# Patient Record
(demographics unavailable — no encounter records)

---

## 2024-10-28 NOTE — CONSULT LETTER
[Dear  ___] : Dear  [unfilled], [Consult Letter:] : I had the pleasure of evaluating your patient, [unfilled]. [Please see my note below.] : Please see my note below. [Consult Closing:] : Thank you very much for allowing me to participate in the care of this patient.  If you have any questions, please do not hesitate to contact me. [Sincerely,] : Sincerely, [DrAkiko  ___] : Dr. ARGUELLES [FreeTextEntry3] : Myron Myron I. Kleiner, M.D., FACR Chief, Division of Rheumatology Department of Medicine [facility] Chief, Division of Rheumatology Department of Medicine [facility]

## 2024-10-28 NOTE — HISTORY OF PRESENT ILLNESS
[FreeTextEntry1] : ORTEGA ALMAGUER is a 54 year old woman who presents for follow up office visit for further evaluation of joint symptoms and rheumatic diseases including Raynaud's, vitamin D deficiency, Sjogren's syndrome  Patient feels fairly well. Denies recent joint pain. No recent Raynauds Episodes. Denies recent dry eyes and dry mouth. Not using artificial tears and ACT mouthwash Secondary to no need. Oral hydration with adequate relief. The patient continues vitamin D supplements.  Patient denies rash or side effects with current medications. Patient is content with current medication regimen.

## 2024-10-28 NOTE — CONSULT LETTER
[Dear  ___] : Dear  [unfilled], [Consult Letter:] : I had the pleasure of evaluating your patient, [unfilled]. [Please see my note below.] : Please see my note below. [Consult Closing:] : Thank you very much for allowing me to participate in the care of this patient.  If you have any questions, please do not hesitate to contact me. [Sincerely,] : Sincerely, [DrAkiko  ___] : Dr. ARGUELLES [FreeTextEntry3] : Myron Myron I. Kleiner, M.D., FACR Chief, Division of Rheumatology Department of Medicine [facility] Chief, Division of Rheumatology Department of Medicine [facility]

## 2024-10-28 NOTE — CONSULT LETTER
[Dear  ___] : Dear  [unfilled], [Consult Letter:] : I had the pleasure of evaluating your patient, [unfilled]. [Please see my note below.] : Please see my note below. [Consult Closing:] : Thank you very much for allowing me to participate in the care of this patient.  If you have any questions, please do not hesitate to contact me. [Sincerely,] : Sincerely, [DrAkiko  ___] : Dr. ARGUELLES [FreeTextEntry3] : Myron Myron I. Kleiner, M.D., FACR Chief, Division of Rheumatology Department of Medicine [facility] Chief, Division of Rheumatology Department of Medicine [facility]

## 2024-10-28 NOTE — ASSESSMENT
[FreeTextEntry1] : Impression: ORTEGA ALMAGUER is a 54 year old presents for f/u for further evaluation of joint symptoms and rheumatic diseases including Raynaud's, osteopenia, Sjogren's syndrome, vitamin D deficiency  Patient feels fairly well. Denies recent joint pain. On exam pt has mild diffuse swelling bilateral fingers - I will continue to monitor for MCTD and scleroderma. No recent Raynauds Episodes. Denies recent dry eyes and dry mouth - on exam pt has dry eyes and tongue dry Secondary to Sjogren Syndrome. Not using artificial tears and ACT mouthwash Secondary to no need. Oral hydration with adequate relief. Recent bone densitometry results revealed osteopenia -- with extensive discussion. The patient continues vitamin D supplements for Vitamin D deficiency. Patient denies rash or side effects with current medications. Patient is content with current medication regimen.   Plan: I reviewed patients chart and previous records with extensive discussion Lab tests results reviewed with the patient with extensive discussion I reviewed recent Bone densitometry results with patient including my analysis of raw data with extensive discussion Laboratory tests ordered today - see list below - with coordination of care Diagnosis and prognosis discussed Continue current medications (other than those changed below) Discontinue OTC Vitamin D Vitamin D 50,000 units once a week (possible side effects explained) Calcium 500 mg t.i.d. at the end of meals or 600 mg b.i.d end of meals (Possible side effects explained) Patient declines any other changes or additions to medication regimen. Artificial tears one drop each eye q.i.d. and p.r.n.(Possible side effects explained) ACT mouthwash/spray q.i.d. and p.r.n.(Possible side effects explained) Oral hydration Patient declined oral medication for their dryness Keep hands and feet and torso warm - patient warned of dangers of gangrene/digital amputation with Raynaud's--extensive discussion Increase home thermostat to at least 72 to 74 F--extensive discussion Return visit 3 months  All questions and concerns were addressed Total time for this office visit, including face-to-face time and non-face-to-face time, 72 minutes--- including review of the chart and previous records, detailed review of her medical history, review of previous lab results with extensive discussion with the patient, ordering lab tests with coordination of care, review of previous imaging reports/x-ray results, reviewed bone densitometry results including my analysis of the raw data with extensive discussion, detailed medication history, review of medications going forward with their possible side effects, reviewed protocol for prevention of Raynaud's with extensive discussion as noted above, reviewed the modalities for treatment of her dryness with extensive discussion

## 2024-10-28 NOTE — PHYSICAL EXAM
[General Appearance - Alert] : alert [General Appearance - In No Acute Distress] : in no acute distress [General Appearance - Well Nourished] : well nourished [General Appearance - Well Developed] : well developed [General Appearance - Well-Appearing] : healthy appearing [Sclera] : the sclera and conjunctiva were normal [PERRL With Normal Accommodation] : pupils were equal in size, round, and reactive to light [Extraocular Movements] : extraocular movements were intact [Outer Ear] : the ears and nose were normal in appearance [Oropharynx] : the oropharynx was normal [Neck Appearance] : the appearance of the neck was normal [Neck Cervical Mass (___cm)] : no neck mass was observed [Jugular Venous Distention Increased] : there was no jugular-venous distention [Thyroid Diffuse Enlargement] : the thyroid was not enlarged [Lungs Percussion] : the lungs were normal to percussion [Heart Rate And Rhythm] : heart rate was normal and rhythm regular [Edema] : there was no peripheral edema [Abdomen Soft] : soft [Abdomen Tenderness] : non-tender [Abdomen Mass (___ Cm)] : no abdominal mass palpated [Cervical Lymph Nodes Enlarged Posterior Bilaterally] : posterior cervical [Cervical Lymph Nodes Enlarged Anterior Bilaterally] : anterior cervical [Supraclavicular Lymph Nodes Enlarged Bilaterally] : supraclavicular [Axillary Lymph Nodes Enlarged Bilaterally] : axillary [No CVA Tenderness] : no ~M costovertebral angle tenderness [No Spinal Tenderness] : no spinal tenderness [Skin Color & Pigmentation] : normal skin color and pigmentation [Skin Turgor] : normal skin turgor [] : no rash [Cranial Nerves] : cranial nerves 2-12 were intact [Sensation] : the sensory exam was normal to light touch and pinprick [Motor Exam] : the motor exam was normal [No Focal Deficits] : no focal deficits [Oriented To Time, Place, And Person] : oriented to person, place, and time [Impaired Insight] : insight and judgment were intact [Affect] : the affect was normal [Mood] : the mood was normal [FreeTextEntry1] : Strength 5/5, Recall 3/3

## 2024-10-28 NOTE — ADDENDUM
[FreeTextEntry1] :  I, Jimmy Sánchez, acted solely as a scribe for Dr. Myron I. Kleiner, MD. on 10/21/2024. I personally performed the services described in the documentation, reviewed the documentation recorded by the scribe in my presence, and it accurately and completely records my words and actions.

## 2025-01-21 NOTE — CONSULT LETTER
[Dear  ___] : Dear  [unfilled], [Consult Letter:] : I had the pleasure of evaluating your patient, [unfilled]. [Please see my note below.] : Please see my note below. [Consult Closing:] : Thank you very much for allowing me to participate in the care of this patient.  If you have any questions, please do not hesitate to contact me. [Sincerely,] : Sincerely, [DrAkiko  ___] : Dr. ARGUELLES [FreeTextEntry3] : Myron Myron I. Kleiner, M.D., FACR Chief, Division of Rheumatology Department of Medicine [facility] Chief, Division of Rheumatology Department of Medicine [facility]

## 2025-01-21 NOTE — CONSULT LETTER
[Dear  ___] : Dear  [unfilled], [Consult Letter:] : I had the pleasure of evaluating your patient, [unfilled]. [Please see my note below.] : Please see my note below. [Consult Closing:] : Thank you very much for allowing me to participate in the care of this patient.  If you have any questions, please do not hesitate to contact me. [Sincerely,] : Sincerely, [DrAkiko  ___] : Dr. ARGUELLES [FreeTextEntry3] : Myron Myron I. Kleiner, M.D., FACR Chief, Division of Rheumatology Department of Medicine [facility] Chief, Division of Rheumatology Department of Medicine [facility]

## 2025-01-21 NOTE — ASSESSMENT
[FreeTextEntry1] : Impression: ORTEGA ALMAGUER is a 54 year old presents for f/u for further evaluation of joint symptoms and rheumatic diseases including Raynaud's, Sjogren's syndrome, vitamin D deficiency, osteopenia  Patient feels fairly well. No recent Raynauds Episodes. On exam pt has mild diffuse swelling bilateral fingers - I will continue to monitor for MCTD and scleroderma. Some recent dry eyes and dry mouth - on exam pt has dry eyes and tongue dry Secondary to Sjogren Syndrome. Using and oral hydration with relief. Not using biotene mouthwash and artificial tears Secondary to a lack of relief. Recent laboratory tests results reveal no significant changes or results, with extensive discussion. The patient continues calcium and vitamin D supplements for her osteopenia and Vitamin D deficiency. Patient denies rash or side effects with current medications. Patient is content with current medication regimen.   Plan: I reviewed patients chart and previous records with extensive discussion Lab tests results reviewed with the patient with extensive discussion Laboratory tests ordered today - see list below - with coordination of care Diagnosis and prognosis discussed Continue current medications (other than those changed below) Patient declines any other changes or additions to medication regimen. Artificial tears one drop each eye q.i.d. and p.r.n.(Possible side effects explained) Gen-Teal "severe" one drop each eye q.i.d. p.r.n. BioXtra mouthwash/spray q.i.d. and p.r.n. (Possible side effects explained) Oral hydration Patient declined oral medication for their dryness Keep hands and feet and torso warm - patient warned of dangers of gangrene/digital amputation with Raynaud's--extensive discussion Increase home thermostat to at least 72 to 74 F--extensive discussion Pt declined Ophthalmology consult regarding punctal plugs for her lacrimal tear ducts Return visit 4 months  All questions and concerns were addressed

## 2025-01-21 NOTE — HISTORY OF PRESENT ILLNESS
[FreeTextEntry1] : ORTEGA ALMAGUER is a 54 year old woman who presents for follow up office visit for further evaluation of joint symptoms and rheumatic diseases including Raynaud's, osteopenia, Sjogren's syndrome, vitamin D deficiency  Patient feels fairly well. No recent Raynauds Episodes. Some recent dry eyes and dry mouth. Using and oral hydration with relief. Not using biotene mouthwash and artificial tears Secondary to a lack of relief. The patient continues calcium and vitamin D supplements.  Patient denies rash or side effects with current medications. Patient is content with current medication regimen.

## 2025-01-21 NOTE — ADDENDUM
[FreeTextEntry1] :  I, Jimmy Sánchez, acted solely as a scribe for Dr. Myron I. Kleiner, MD. on 01/13/2025. I personally performed the services described in the documentation, reviewed the documentation recorded by the scribe in my presence, and it accurately and completely records my words and actions.

## 2025-01-21 NOTE — CONSULT LETTER
[Dear  ___] : Dear  [unfilled], [Consult Letter:] : I had the pleasure of evaluating your patient, [unfilled]. [Please see my note below.] : Please see my note below. [Consult Closing:] : Thank you very much for allowing me to participate in the care of this patient.  If you have any questions, please do not hesitate to contact me. [Sincerely,] : Sincerely, [DrAkiko  ___] : Dr. ARGUELLES [FreeTextEntry3] : Myron Myron I. Kleiner, M.D., FACR Chief, Division of Rheumatology Department of Medicine [facility] Chief, Division of Rheumatology Department of Medicine [facility]

## 2025-05-07 NOTE — PHYSICAL EXAM
[No Acute Distress] : no acute distress [Well-Appearing] : well-appearing [No Respiratory Distress] : no respiratory distress  [No Accessory Muscle Use] : no accessory muscle use [Clear to Auscultation] : lungs were clear to auscultation bilaterally [Normal Rate] : normal rate  [Regular Rhythm] : with a regular rhythm [No Focal Deficits] : no focal deficits [Normal Affect] : the affect was normal

## 2025-05-08 NOTE — HISTORY OF PRESENT ILLNESS
[FreeTextEntry8] : The patient presents for an acute visit secondary to elevated blood pressure. The patient has had a blood pressure taken multiple times over the last week including at gastroenterology when it was 170/90 and since then at home with her home meter and by her school nurse.  Those pressures were 140-145/80-90. Looking back the patient blood pressures over the last year have been high normal up to 138/80. She does have a strong family history of hypertension in both her mother and father. Generally feeling well without complaints including no chest pain, palpitations, shortness of breath or edema.

## 2025-05-08 NOTE — ASSESSMENT
[FreeTextEntry1] : Patient with new onset uncontrolled hypertension with strong family history of hypertension therefore likely essential hypertension. Patient's weight is good and follows a good lifestyle therefore no lifestyle changes can be recommended to hopefully improve without medication therefore feel she should be treated which she agrees to do. Therefore start valsartan 80 mg daily. Blood work done to check CMP, CBC, cortisol and aldosterone. Follow-up in 3 weeks to recheck blood pressure and BMP.

## 2025-05-20 NOTE — PHYSICAL EXAM
[General Appearance - Alert] : alert [General Appearance - In No Acute Distress] : in no acute distress [General Appearance - Well Nourished] : well nourished [General Appearance - Well Developed] : well developed [General Appearance - Well-Appearing] : healthy appearing [] : normal voice and communication [Sclera] : the sclera and conjunctiva were normal [PERRL With Normal Accommodation] : pupils were equal in size, round, and reactive to light [Extraocular Movements] : extraocular movements were intact [Outer Ear] : the ears and nose were normal in appearance [Oropharynx] : the oropharynx was normal [Neck Appearance] : the appearance of the neck was normal [Neck Cervical Mass (___cm)] : no neck mass was observed [Jugular Venous Distention Increased] : there was no jugular-venous distention [Thyroid Diffuse Enlargement] : the thyroid was not enlarged [Cervical Lymph Nodes Enlarged Posterior Bilaterally] : posterior cervical [Cervical Lymph Nodes Enlarged Anterior Bilaterally] : anterior cervical [Supraclavicular Lymph Nodes Enlarged Bilaterally] : supraclavicular [Axillary Lymph Nodes Enlarged Bilaterally] : axillary [No CVA Tenderness] : no ~M costovertebral angle tenderness [No Spinal Tenderness] : no spinal tenderness [FreeTextEntry1] : Back-spine/SI nontender, straight leg raising negative; LIOR negative

## 2025-05-28 NOTE — PHYSICAL EXAM
[No Acute Distress] : no acute distress [No Respiratory Distress] : no respiratory distress  [No Accessory Muscle Use] : no accessory muscle use [Clear to Auscultation] : lungs were clear to auscultation bilaterally [Normal Rate] : normal rate  [Regular Rhythm] : with a regular rhythm [No Focal Deficits] : no focal deficits [Normal Affect] : the affect was normal

## 2025-05-29 NOTE — ASSESSMENT
[FreeTextEntry1] : Patient with uncontrolled hypertension now on valsartan 80 mg daily with excellent blood pressure control therefore to be continued. Patient had blood work last week by rheumatology including CMP with normal potassium and creatinine therefore no blood work today. Follow-up on June 19 for her schedule CPE.

## 2025-05-29 NOTE — HISTORY OF PRESENT ILLNESS
[FreeTextEntry1] : Follow-up hypertension [de-identified] : The patient presents for follow-up after being seen on May 8, 2025 for an acute visit secondary to uncontrolled hypertension. On that visit the patient was started on valsartan 80 mg daily which she continues with tolerance and no side effects. She has been checking her blood pressure at home which is definitely improved on average 120/80.

## 2025-06-19 NOTE — COUNSELING
[Good understanding] : Patient has a good understanding of disease, goals and obesity follow-up plan [None] : None [de-identified] : Continue diet and exercise

## 2025-06-19 NOTE — PHYSICAL EXAM
[General Appearance - Alert] : alert [General Appearance - In No Acute Distress] : in no acute distress [Sclera] : the sclera and conjunctiva were normal [PERRL With Normal Accommodation] : pupils were equal in size, round, and reactive to light [Extraocular Movements] : extraocular movements were intact [Outer Ear] : the ears and nose were normal in appearance [Oropharynx] : the oropharynx was normal [Neck Appearance] : the appearance of the neck was normal [Neck Cervical Mass (___cm)] : no neck mass was observed [Jugular Venous Distention Increased] : there was no jugular-venous distention [Thyroid Diffuse Enlargement] : the thyroid was not enlarged [Thyroid Nodule] : there were no palpable thyroid nodules [Auscultation Breath Sounds / Voice Sounds] : lungs were clear to auscultation bilaterally [Heart Rate And Rhythm] : heart rate was normal and rhythm regular [Heart Sounds] : normal S1 and S2 [Heart Sounds Gallop] : no gallops [Murmurs] : no murmurs [Heart Sounds Pericardial Friction Rub] : no pericardial rub [Arterial Pulses Carotid] : carotid pulses were normal with no bruits [Abdominal Aorta] : the abdominal aorta was normal [Full Pulse] : the pedal pulses are present [Edema] : there was no peripheral edema [Bowel Sounds] : normal bowel sounds [Abdomen Soft] : soft [Abdomen Tenderness] : non-tender [Abdomen Mass (___ Cm)] : no abdominal mass palpated [Cervical Lymph Nodes Enlarged Posterior Bilaterally] : posterior cervical [Cervical Lymph Nodes Enlarged Anterior Bilaterally] : anterior cervical [Supraclavicular Lymph Nodes Enlarged Bilaterally] : supraclavicular [Axillary Lymph Nodes Enlarged Bilaterally] : axillary [Femoral Lymph Nodes Enlarged Bilaterally] : femoral [Inguinal Lymph Nodes Enlarged Bilaterally] : inguinal [No Spinal Tenderness] : no spinal tenderness [Abnormal Walk] : normal gait [Nail Clubbing] : no clubbing  or cyanosis of the fingernails [Musculoskeletal - Swelling] : no joint swelling seen [Motor Tone] : muscle strength and tone were normal [Skin Color & Pigmentation] : normal skin color and pigmentation [Skin Turgor] : normal skin turgor [] : no rash [Cranial Nerves] : cranial nerves 2-12 were intact [No Focal Deficits] : no focal deficits [Oriented To Time, Place, And Person] : oriented to person, place, and time [Impaired Insight] : insight and judgment were intact [Affect] : the affect was normal [Mood] : the mood was normal [FreeTextEntry1] : via GYN [Over the Past 2 Weeks, Have You Felt Down, Depressed, or Hopeless?] : 1.) Over the past 2 weeks, have you felt down, depressed, or hopeless? No [Over the Past 2 Weeks, Have You Felt Little Interest or Pleasure Doing Things?] : 2.) Over the past 2 weeks, have you felt little interest or pleasure doing things? No

## 2025-06-19 NOTE — HISTORY OF PRESENT ILLNESS
[FreeTextEntry1] : 55-year-old female in for her yearly physical.  Patient with elevated blood pressure and diagnosed with hypertension May 2025 now on valsartan 80 mg daily with tolerance and benefit.  No side effects.   Patient chronic issues include IBS which is being controlled with Elavil.  Also as needed hycosamine The patient did do a trial of weaning off of Elavil with fairly quick recurrence of her symptoms therefore continues on Elavil. Endoscopy February 2024  She saw neurology secondary to memory concerns with family history of MS. MRI of the brain showed subcortical white matter changes without enhancement. Mild abnormalities with some aspects of cognitive testing. MRA = normal. According to patient no evidence of PFO The patient had a lumbar puncture stating that only abnormality she was told that that protein was increased. "No evidence of MS" Unfortunately the patient got a post lumbar puncture headache which needed ER treatment including a blood patch. She continues to follow with neurology stating follow-up work-up compatible with Sjogren syndrome.  She states follow up neurocognitive testing showed her memory to be better.  She feels this also. Neurology evaluation with questionable Sjogren's syndrome therefore patient followed up with rheumatology who she continues to follow with and has been diagnosed with Sjogren syndrome with associated dry eyes with no specific treatment given. Also rheumatology had patient do bone density July 2024 with osteopenia on calcium and vitamin D.  Patient without any specific complaints including no chest pain, palpitations or shortness of breath.  She had an issue with need to frequently clear her throat therefore saw ENT who thought it was secondary to reflux and put her on omeprazole.  She followed up with GI with endoscopy April 2023 essentially normal.  No need for PPI.  Symptoms better.  The patient does have hyperlipidemia despite a good diet and exercise with low weight. The patient's mother also had hyperlipidemia and ASHD. 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 As recommended the patient did see cardiology and she had a coronary calcium CAT scan July 2021 with score = 0.  Follow-up coronary calcium CAT scan August 2023 with score again = 0 .Therefore cardiology does not feel her cholesterol needs to be treated.  The patient's father has colon cancer. the patient's colonoscopy January 2022 with follow-up in 3 to 5 years.  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

## 2025-06-19 NOTE — ASSESSMENT
[Vaccines Reviewed] : Immunizations reviewed today. Please see immunization details in the vaccine log within the immunization flowsheet.  [FreeTextEntry1] : 55-year-old female with good general health without any clinical issues at the present time. Patient is to continue present treatment and continue diet and exercise. IBS is controlled with present medication Elavil therefore we'll continue.  New diagnosis of hypertension May 2025 with good control with valsartan 80 mg daily which will be continued.  The patient's main issue now is some memory deficit with neurological workup in progress. It appears MS has been ruled out. Follow-up with neurology showing signs of Sjogren syndrome but feel no treatment needed at the present time.  No recommendation to see rheumatology.  I did give patient referral to see rheumatology for opinion. Neurocognitive testing and patient feel memory is better.  Blood work via neurology suggestive of Sjogren syndrome with the patient following with rheumatology with diagnosis of Sjogren syndrome with associated dry eyes.  Follow-up as scheduled.  Patient with hyperlipidemia which is likely genetic and family history of CAD status post cardiac evaluation with most recent coronary calcium CAT scan August 2023 score = 0 therefore cardiology does not feel cholesterol needs to be treated.   Has follow-up with cardiology scheduled  Family history of colon cancer in her father with colonoscopy up-to-date January 2022 with follow-up in 3 to 5 years with follow-up colonoscopy scheduled  GYN with Dr. Barnes Mamm[facility] April 2025 Bone density July 2024  Received COVID vaccines but declines influenza vaccine Tdap August 2023 Shingrix discussed  Follow-up in 4 months Venipuncture done today in the office

## 2025-06-19 NOTE — HEALTH RISK ASSESSMENT
[Very Good] : ~his/her~  mood as very good [2 - 4 times a month (2 pts)] : 2-4 times a month (2 points) [1 or 2 (0 pts)] : 1 or 2 (0 points) [Never (0 pts)] : Never (0 points) [No] : In the past 12 months have you used drugs other than those required for medical reasons? No [No falls in past year] : Patient reported no falls in the past year [0] : 2) Feeling down, depressed, or hopeless: Not at all (0) [PHQ-2 Negative - No further assessment needed] : PHQ-2 Negative - No further assessment needed [Never] : Never [YES] : Yes [Are there any unlocked firearms stored in your household?] : There are unlocked firearms in the household. [Are there any children in your household?] : There are children in the household. [Have you attended a firearm safety workshop or class?] : A firearm safety workshop or class has been attended. [Patient reported PAP Smear was normal] : Patient reported PAP Smear was normal [Patient reported colonoscopy was abnormal] : Patient reported colonoscopy was abnormal [With Significant Other] : lives with significant other [With Family] : lives with family [# of Members in Household ___] :  household currently consist of [unfilled] member(s) [Employed] : employed [Graduate School] : graduate school [] :  [# Of Children ___] : has [unfilled] children [Sexually Active] : sexually active [Feels Safe at Home] : Feels safe at home [Fully functional (bathing, dressing, toileting, transferring, walking, feeding)] : Fully functional (bathing, dressing, toileting, transferring, walking, feeding) [Fully functional (using the telephone, shopping, preparing meals, housekeeping, doing laundry, using] : Fully functional and needs no help or supervision to perform IADLs (using the telephone, shopping, preparing meals, housekeeping, doing laundry, using transportation, managing medications and managing finances) [Smoke Detector] : smoke detector [Carbon Monoxide Detector] : carbon monoxide detector [Seat Belt] :  uses seat belt [Sunscreen] : uses sunscreen [Reviewed no changes] : Reviewed, no changes [Discussed at today's visit] : Advance Directives Discussed at today's visit [Designated Healthcare Proxy] : Designated healthcare proxy [Name: ___] : Health Care Proxy's Name: [unfilled]  [None] : Patient does not have any barriers to medication adherence [Yes] : Reviewed medication list for presence of high-risk medications. [Audit-CScore] : 2 [de-identified] : exercises regularly [de-identified] : good [[id number]] : 0 [Are there any firearms stored in your household that are loaded?] : No firearms are stored in the household loaded. [Has anyone in the household been feeling low/depressed/been struggling?] : No one in the household has been feeling low/depressed/been struggling. [Change in mental status noted] : No change in mental status noted [Reports changes in hearing] : Reports no changes in hearing [Reports changes in vision] : Reports no changes in vision [Reports changes in dental health] : Reports no changes in dental health [ColonoscopyDate] : 01/22 [ColonoscopyComments] : diverticulosis [FreeTextEntry2] : Teacher [de-identified] : lockeed and safe [AdvancecareDate] : 06/25